# Patient Record
Sex: FEMALE | Employment: UNEMPLOYED | URBAN - METROPOLITAN AREA
[De-identification: names, ages, dates, MRNs, and addresses within clinical notes are randomized per-mention and may not be internally consistent; named-entity substitution may affect disease eponyms.]

---
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
## 2018-03-19 ENCOUNTER — OFFICE VISIT (OUTPATIENT)
Dept: FAMILY MEDICINE CLINIC | Age: 18
End: 2018-03-19

## 2018-03-19 VITALS
SYSTOLIC BLOOD PRESSURE: 125 MMHG | HEART RATE: 81 BPM | WEIGHT: 115 LBS | BODY MASS INDEX: 22.58 KG/M2 | TEMPERATURE: 97.5 F | HEIGHT: 60 IN | DIASTOLIC BLOOD PRESSURE: 66 MMHG

## 2018-03-19 DIAGNOSIS — H53.9 VISION CHANGES: ICD-10-CM

## 2018-03-19 DIAGNOSIS — Z23 ENCOUNTER FOR IMMUNIZATION: ICD-10-CM

## 2018-03-19 DIAGNOSIS — Z13.9 ENCOUNTER FOR SCREENING: Primary | ICD-10-CM

## 2018-03-19 LAB — HGB BLD-MCNC: 12.1 G/DL

## 2018-03-19 NOTE — PROGRESS NOTES
Assessment/Plan:    Diagnoses and all orders for this visit:    1. Encounter for screening  -     AMB POC HEMOGLOBIN (HGB)    2. Vision changes  Refer to [facility]ena's best  3. Lactating mother  She needs to be on PNV, this was discussed with her    4. Encounter for immunization  -     Influenza virus vaccine (QUADRIVALENT PRES FREE SYRINGE) IM (62881)    Other orders  -     prenatal vit-calcium-iron-fa (PRENATAL PLUS WITH CALCIUM) 27 mg iron- 1 mg tab; Take 1 Tab by mouth daily. Indications: pregnancy    Try to nap when baby does if she is not feeling rested     Follow-up Disposition:  Return if symptoms worsen or fail to improve. Keenan Brawn McFerren, PA-C Merlin Chapman Bullion expressed understanding of this plan. An AVS was printed and given to the patient.      ----------------------------------------------------------------------    Chief Complaint   Patient presents with    Fatigue    Dizziness    Immunization/Injection       History of Present Illness:  22 yo new pt here for fatigue. She is a new mom and is breastfeeding her baby. She is not taking PNV. She \"didn't know\" she was to continue the PNV. She has a 9 month old baby. The baby co-sleeps with the parents, and feeds most of the night. The pt is staying at home with the baby and not working. She does not have any family to help her. She has a mom in town but she works full time. She is here today with her . They have a supportive relationship but he is always working. She denies any risk of DV. She is not on any form of birth control. She doesn't want to use pills and he doesn't like condoms. We had a milagro discussion about how this will likely end up in another pregnancy and she does not want that now she states. Will give her info for the health dept and they can discuss other options with her. She has problems with seeing small letters when she is reading.  This is new she states        Past Medical History:   Diagnosis Date    History of varicella     at age 1       Current Outpatient Prescriptions   Medication Sig Dispense Refill    prenatal vit-calcium-iron-fa (PRENATAL PLUS WITH CALCIUM) 27 mg iron- 1 mg tab Take 1 Tab by mouth daily. Indications: pregnancy 90 Tab 4    ferrous sulfate 325 mg (65 mg iron) tablet Take 1 Tab by mouth daily (with breakfast). 30 Tab 0    ibuprofen (MOTRIN) 800 mg tablet Take 1 Tab by mouth every six (6) hours as needed for Pain. 40 Tab 0    Heating Pads pads Apply to neck for pain relieve. Aplique al area del marianela para tratar dolor. 1 Each 0    docusate sodium (COLACE) 100 mg capsule Take 1 Cap by mouth daily. 30 Cap 0       No Known Allergies    Social History   Substance Use Topics    Smoking status: Never Smoker    Smokeless tobacco: Never Used    Alcohol use No       No family history on file.     Physical Exam:     Visit Vitals    /66 (BP 1 Location: Left arm, BP Patient Position: Sitting)    Pulse 81    Temp 97.5 °F (36.4 °C) (Oral)    Ht 4' 11.65\" (1.515 m)    Wt 115 lb (52.2 kg)    LMP 03/03/2018    BMI 22.73 kg/m2     Smiling, looks well, holding healthy 20 lb baby on her lap   A&Ox3  WDWN NAD  Respirations normal and non labored  Cor RRR s1s2  Lungs CTA adam  Lab Results   Component Value Date/Time    Hemoglobin (POC) 12.1 03/19/2018 10:05 AM    HGB 12.1 05/17/2017 04:42 AM

## 2018-03-19 NOTE — PROGRESS NOTES
Printed AVS, provided to pt and reviewed. Pt indicated understanding and had no questions. Told pt that rx's have been sent to pharmacy and they should be ready for  in approximately 2 hrs.reviewed the prenatal vitamin's with the pt Provided pt with eyecare resources. Gave info on the HD for contraceptive. Requests flu vaccine; denies fever, egg allergy. Immunization given per protocol and recorded in [address]. VIS information sheet given, explained possible S/E. Reviewed sx indicating need to be seen in ER. Pt had no adverse reaction at time of discharge. Maico Lima was the .  Royal Lorri RN

## 2018-03-19 NOTE — MR AVS SNAPSHOT
[address] [facility] [address] 
 
 
 [facility] [address] 
[phone] Patient: Natasha Moreno MRN: [id number] :2000 Visit Information Teedannielle Domínguez dannielle Raulsheyla Personal Médico Departamento Teléfono del Dep. Número de visita 3/19/2018 10:45 AM MADELEINE Valle[address] [phone] Follow-up Instructions Return if symptoms worsen or fail to improve. Your Appointments 3/19/2018 10:45 AM  
[facility]: Sick Visit [facility] [address] [facility] 7 [id number]  
[phone]  
  
   
 [facility] [address] [place] [address] [phone] Upcoming Health Maintenance Date Due Hepatitis A Peds Age 1-18 (2 of 2 - Standard Series) 10/22/2016 HPV AGE 9Y-26Y (3 of 3 - Female 3 Dose Series) 2017 Influenza Age 5 to Adult 2017 DTaP/Tdap/Td series (3 - Td) 2017 Alergias  Review Complete El: 2017 Por: JoséM iguel Denny RN  
 A partir del:  3/19/2018 No Known Allergies Vacunas actuales Revisadas el:  2016 Marilee Friendly HPV 2016 HPV (9-valent) 2016 Hep A Vaccine 2016 Hep B Vaccine 2016 Hep B, Adol/Ped 2016 IPV 2016 Influenza Vaccine 2016 Influenza Vaccine (Quad) PF 10/31/2016 MMR 2016, 2016 Meningococcal ACWY Vaccine 2016 Poliovirus vaccine 2016 Td, Adsorbed PF 2016 Tdap 2017, 2016 Varicella Virus Vaccine 2016 No revisadas esta visita You Were Diagnosed With   
  
 Ferdinand Francois Encounter for screening    -  Primary ICD-10-CM: Z13.9 ICD-9-CM: V82.9 Vision changes     ICD-10-CM: H53.9 ICD-9-CM: 368.9 Lactating mother     ICD-10-CM: Z39.1 ICD-9-CM: V24.1 Partes vitales PS Pulso Temperatura [place] ( percentil de crecimiento) Peso (percentil de crecimiento) 125/66 (97 %/ 66 %)* (BP 1 Location: Left arm, BP Patient Position: Sitting) 81 97.5 °F (36.4 °C) (Oral) 4' 11.65\" (1.515 m) (3 %, Z= -1.82) 115 lb (52.2 kg) (24 %, Z= -0.71) LMP (última salazar) BMI ([address]) Estado obstétrico Estatus de tabaquísmo 03/03/2018 22.73 kg/m2 (61 %, Z= 0.29) Having regular periods Never Smoker *BP percentiles are based on NHBPEP's 4th Report Growth percentiles are based on CDC 2-20 Years data. Historial de signos vitales BMI and BSA Data Body Mass Index Body Surface Area  
 22.73 kg/m 2 1.48 m 2 [facility] Name Phone [facility], [address] [place] [address] Street [facility] [phone] Guerrero lista de medicamentos actualizada Lorenda Leventhal actualizada 3/19/18 10:35 AM.  Tricia Laming use guerrero lista de medicamentos más reciente. docusate sodium 100 mg capsule También conocido gladis:  Lety Bridgett Take 1 Cap by mouth daily. ferrous sulfate 325 mg (65 mg iron) tablet Take 1 Tab by mouth daily (with breakfast). Heating Pads Pads Apply to neck for pain relieve. Aplique al area del marianela para tratar dolor. ibuprofen 800 mg tablet También conocido gladis:  MOTRIN Take 1 Tab by mouth every six (6) hours as needed for Pain.  
  
 prenatal vit-calcium-iron-fa 27 mg iron- 1 mg Tab También conocido gladis:  PRENATAL PLUS with CALCIUM Take 1 Tab by mouth daily. Indications: pregnancy Recetas Enviado a la Felix Refills  
 prenatal vit-calcium-iron-fa (PRENATAL PLUS WITH CALCIUM) 27 mg iron- 1 mg tab 4 Sig: Take 1 Tab by mouth daily. Indications: pregnancy Class: Normal  
 Pharmacy: [facility] [address], [address] #: [phone] Route: Oral  
  
Hicimos lo siguiente AMB POC HEMOGLOBIN (HGB) [31437 CPT(R)] Instrucciones de seguimiento Return if symptoms worsen or fail to improve. Introducing [facility]! [facility] introduce portal paciente MyChart . Ahora se puede acceder a partes de guerrero expediente médico, enviar por correo electrónico la oficina de guerrero médico y solicitar renovaciones de medicamentos en línea. En guerrero navegador de Internet , Chi Suggs a [email or web address] Miya clic en el usuario por Pa Badillo? Jaya Chute clic aquí en la sesión [facility]. Verá la página de registro [place]. Ingrese guerrero código de [employer] Magdalena cynthia y gladis aparece a continuación. Usted no tendrá que [employer] código después de maryann completado el proceso de registro . Si usted no se inscribe antes de la fecha de caducidad , debe solicitar un nuevo código. · MyChart Código de acceso : [id number] Expires: 6/17/2018 10:35 AM 
 
Ingresa los últimos cuatro dígitos de guerrero Número de Seguro Social ( xxxx ) y fecha de nacimiento ( dd / mm / aaaa ) gladis se indica y miya clic en Enviar. Usted será llevado a la siguiente página de registro . Crear un ID MyChart . Esta será guerrero ID de inicio de sesión de MyChart y no puede ser [place] , por lo que pensar en rafa que es Oralee Lobstein y fácil de recordar . Crear rafa contraseña MyChart . Usted puede cambiar guerrero contraseña en cualquier momento . Ingrese guerrero Password Reset de preguntas y [place] . [place] se puede utilizar en un momento posterior si usted olvida guerrero contraseña. Introduzca guerrero dirección de correo electrónico . Rakesh Bunch recibirá rafa notificación por correo electrónico cuando la nueva información está disponible en MyChart . [place] Gianotti clic en Registrarse. Carmen  alberto y descargar porciones de guerrero expediente médico. 
Miya clic en el enlace de descarga del menú Resumen para descargar rafa copia portátil de guerrero información médica . Si tiene [employer] , por favor visite la sección de preguntas frecuentes del sitio web MyChart . Recuerde, MyChart NO es que se utilizará para las necesidades urgentes. Para emergencias médicas , llame al 911 . Ahora disponible en guerrero iPhone y Android ! Por favor proporcione karla resumen de la documentación de cuidado a guerrero próximo proveedor. Your primary care clinician is listed as Levonia Parrot. Anthon Runner. If you have any questions after today's visit, please call [phone].

## 2018-06-05 ENCOUNTER — HOSPITAL ENCOUNTER (OUTPATIENT)
Dept: LAB | Age: 18
Discharge: HOME | End: 2018-06-05

## 2018-06-05 ENCOUNTER — OFFICE VISIT (OUTPATIENT)
Dept: FAMILY MEDICINE CLINIC | Age: 18
End: 2018-06-05

## 2018-06-05 VITALS
HEIGHT: 60 IN | SYSTOLIC BLOOD PRESSURE: 106 MMHG | WEIGHT: 117 LBS | BODY MASS INDEX: 22.97 KG/M2 | DIASTOLIC BLOOD PRESSURE: 74 MMHG | HEART RATE: 68 BPM | TEMPERATURE: 97.9 F

## 2018-06-05 DIAGNOSIS — R53.83 FATIGUE, UNSPECIFIED TYPE: ICD-10-CM

## 2018-06-05 DIAGNOSIS — Z13.9 ENCOUNTER FOR SCREENING: Primary | ICD-10-CM

## 2018-06-05 DIAGNOSIS — R10.2 PELVIC PAIN: ICD-10-CM

## 2018-06-05 LAB
ALBUMIN SERPL-MCNC: 4.4 G/DL (ref 3.5–5)
ALBUMIN/GLOB SERPL: 1.3 {RATIO} (ref 1.1–2.2)
ALP SERPL-CCNC: 91 U/L (ref 45–117)
ALT SERPL-CCNC: 37 U/L (ref 12–78)
ANION GAP SERPL CALC-SCNC: 10 MMOL/L (ref 5–15)
AST SERPL-CCNC: 21 U/L (ref 15–37)
BILIRUB SERPL-MCNC: 0.4 MG/DL (ref 0.2–1)
BILIRUB UR QL STRIP: NEGATIVE
BUN SERPL-MCNC: 11 MG/DL (ref 6–20)
BUN/CREAT SERPL: 19 (ref 12–20)
CALCIUM SERPL-MCNC: 9 MG/DL (ref 8.5–10.1)
CHLORIDE SERPL-SCNC: 105 MMOL/L (ref 97–108)
CO2 SERPL-SCNC: 24 MMOL/L (ref 21–32)
CREAT SERPL-MCNC: 0.57 MG/DL (ref 0.55–1.02)
ERYTHROCYTE [DISTWIDTH] IN BLOOD BY AUTOMATED COUNT: 12.5 % (ref 11.5–14.5)
EST. AVERAGE GLUCOSE BLD GHB EST-MCNC: 105 MG/DL
GLOBULIN SER CALC-MCNC: 3.4 G/DL (ref 2–4)
GLUCOSE SERPL-MCNC: 80 MG/DL (ref 65–100)
GLUCOSE UR-MCNC: NEGATIVE MG/DL
HBA1C MFR BLD: 5.3 % (ref 4.2–6.3)
HCT VFR BLD AUTO: 38.8 % (ref 35–47)
HGB BLD-MCNC: 12.6 G/DL (ref 11.5–16)
KETONES P FAST UR STRIP-MCNC: NEGATIVE MG/DL
MCH RBC QN AUTO: 30.5 PG (ref 26–34)
MCHC RBC AUTO-ENTMCNC: 32.5 G/DL (ref 30–36.5)
MCV RBC AUTO: 93.9 FL (ref 80–99)
NRBC # BLD: 0 K/UL (ref 0–0.01)
NRBC BLD-RTO: 0 PER 100 WBC
PH UR STRIP: 5.5 [PH] (ref 4.6–8)
PLATELET # BLD AUTO: 189 K/UL (ref 150–400)
PMV BLD AUTO: 11.9 FL (ref 8.9–12.9)
POTASSIUM SERPL-SCNC: 4.2 MMOL/L (ref 3.5–5.1)
PROT SERPL-MCNC: 7.8 G/DL (ref 6.4–8.2)
PROT UR QL STRIP: NEGATIVE
RBC # BLD AUTO: 4.13 M/UL (ref 3.8–5.2)
SODIUM SERPL-SCNC: 139 MMOL/L (ref 136–145)
SP GR UR STRIP: 1.02 (ref 1–1.03)
T4 FREE SERPL-MCNC: 0.9 NG/DL (ref 0.8–1.5)
TSH SERPL DL<=0.05 MIU/L-ACNC: 1.78 UIU/ML (ref 0.36–3.74)
UA UROBILINOGEN AMB POC: NORMAL (ref 0.2–1)
URINALYSIS CLARITY POC: CLEAR
URINALYSIS COLOR POC: YELLOW
URINE BLOOD POC: NORMAL
URINE LEUKOCYTES POC: NEGATIVE
URINE NITRITES POC: NEGATIVE
WBC # BLD AUTO: 12.2 K/UL (ref 3.6–11)

## 2018-06-05 PROCEDURE — 85027 COMPLETE CBC AUTOMATED: CPT | Performed by: NURSE PRACTITIONER

## 2018-06-05 PROCEDURE — 87186 SC STD MICRODIL/AGAR DIL: CPT | Performed by: NURSE PRACTITIONER

## 2018-06-05 PROCEDURE — 87491 CHLMYD TRACH DNA AMP PROBE: CPT | Performed by: NURSE PRACTITIONER

## 2018-06-05 PROCEDURE — 87077 CULTURE AEROBIC IDENTIFY: CPT | Performed by: NURSE PRACTITIONER

## 2018-06-05 PROCEDURE — 83036 HEMOGLOBIN GLYCOSYLATED A1C: CPT | Performed by: NURSE PRACTITIONER

## 2018-06-05 PROCEDURE — 84439 ASSAY OF FREE THYROXINE: CPT | Performed by: NURSE PRACTITIONER

## 2018-06-05 PROCEDURE — 84443 ASSAY THYROID STIM HORMONE: CPT | Performed by: NURSE PRACTITIONER

## 2018-06-05 PROCEDURE — 87086 URINE CULTURE/COLONY COUNT: CPT | Performed by: NURSE PRACTITIONER

## 2018-06-05 PROCEDURE — 80053 COMPREHEN METABOLIC PANEL: CPT | Performed by: NURSE PRACTITIONER

## 2018-06-05 RX ORDER — IBUPROFEN 800 MG/1
800 TABLET ORAL
Qty: 40 TAB | Refills: 1 | Status: SHIPPED | OUTPATIENT
Start: 2018-06-05

## 2018-06-05 NOTE — PATIENT INSTRUCTIONS
Micción frecuente: Instrucciones de cuidado - [ Frequent Urination: Care Instructions ]  Instrucciones de cuidado  Un síntoma común de problemas urinarios, tales gladis infecciones de las vías urinarias, es tener a menudo ganas de orinar olivier por lo general eliminar poca orina. La vejiga puede inflamarse. [place] puede causar ganas de orinar. Es posible que usted trate de orinar con más frecuencia de lo habitual para tratar de calmar yanna impulso. La micción frecuente (orinar con frecuencia) también puede ser causada por infecciones de transmisión sexual (STI, por keshia siglas en inglés) o cálculos renales. O puede ocurrir cuando algo irrita el conducto que transporta la orina de la vejiga al exterior del cuerpo (uretra). También puede ser rafa señal de diabetes. Puede ser difícil encontrar la causa. Es posible que usted deba hacerse pruebas. La atención de seguimiento es rafa parte clave de guerrero tratamiento y seguridad. Asegúrese de hacer y acudir a todas las citas, y llame a guerrero médico si está teniendo problemas. También es rafa buena idea saber los resultados de los exámenes y mantener rafa lista de los medicamentos que dipak. ¿Cómo puede cuidarse en el hogar? · [employer] agua mile los siguientes betty o [address] [place]. [place] ayudará a que la orina sea menos concentrada. (Si tiene enfermedad de los riñones, el corazón o el hígado y tiene que [place]'s líquidos, hable con guerrero médico antes de aumentar la cantidad de líquidos que apolinar). · Evite las bebidas gaseosas o con cafeína. Pueden irritar la vejiga. Para las mujeres:  · Orine inmediatamente después de maryann tenido relaciones sexuales. · Después de ir al baño, límpiese de adelante hacia atrás. · Evite el uso de duchas vaginales, los yogi de burbujas y los [address] de higiene femenina. Y evite otros productos para la higiene femenina que contengan desodorantes. ¿Cuándo debe pedir ayuda? Llame a guerrero médico ahora mismo o busque atención médica inmediata si:  ?  · Tiene síntomas nuevos gladis fiebre, náuseas o vómito. ? · Tiene síntomas nuevos o peores de un problema urinario. Por ejemplo:  ¨ Tiene zaki o pus en la orina. ¨ Tiene escalofríos o le duele el cuerpo. ¨ Siente dolor al [place]. ¨ Tiene dolor en la gisselle o el abdomen. ¨ Tiene dolor de espalda graciela debajo de la caja torácica (el flanco). ? Vigile muy de cerca los cambios en guerrero cole, y asegúrese de comunicarse con guerrero médico si siente más sed de lo habitual.  ¿Dónde puede encontrar más información en inglés? Hannah Marcano a [email or web address]-abilio.info/. Deepika Martinez S434 en la búsqueda para aprender más acerca de \"Micción frecuente: Instrucciones de cuidado - [ Frequent Urination: Care Instructions ]. \"  Revisado: 12 [place], 2017  Versión del contenido: 11.4  © 3247-9215 Healthwise, Incorporated. Las instrucciones de cuidado fueron adaptadas bajo licencia por [employer] (which disclaims liability or warranty for this information). Si usted tiene [place] afección médica o sobre estas instrucciones, siempre pregunte a guerrero profesional de cole. Healthwise, Incorporated niega toda garantía o responsabilidad por guerrero uso de esta información.

## 2018-06-05 NOTE — PROGRESS NOTES
Patient seen for discharge with assistance from Company  # [id number]. We reviewed the AVS, prescription, pharmacy location and the vision and family [facility] [place] The patient expressed understanding and will go now to registration to schedule a provider follow-up appointment in 6-8 weeks.  Suma Lancaster RN

## 2018-06-05 NOTE — PROGRESS NOTES
Subjective:     Chief Complaint   Patient presents with    Urinary Frequency     and feels depressed everyday    Joint Pain        She  is a 19 y.o. female who presents for evaluation of back pain and urinary frequency. Onset approx 3-4 days ago.     + urinary frequency and expresses concern re: \"kidney problem\" during most recent pregnancy. No assoc malodour nor dysuria. Pain is on L flank only. Has taken OTC APAP w/ mod relief. Reports poor sleep r/t her infant dtr. No provoking stress at home w/ spouse/family. Pt is still breast feeding. Cont to take PNV. No BC/OCP used, declines interest in another child. LMP early may, has been alternating months since birth of most recent child. Did not go for filated eye exam as discussed at [address] [place] [address]. ROS  Gen - no fever/chills  Resp - no dyspnea or cough  CV - no chest pain or MAK  Rest per HPI    Past Medical History:   Diagnosis Date    History of varicella     at age 1     No past surgical history on file. Current Outpatient Prescriptions on File Prior to Visit   Medication Sig Dispense Refill    prenatal vit-calcium-iron-fa (PRENATAL PLUS WITH CALCIUM) 27 mg iron- 1 mg tab Take 1 Tab by mouth daily. Indications: pregnancy 90 Tab 4    ferrous sulfate 325 mg (65 mg iron) tablet Take 1 Tab by mouth daily (with breakfast). 30 Tab 0    ibuprofen (MOTRIN) 800 mg tablet Take 1 Tab by mouth every six (6) hours as needed for Pain. 40 Tab 0    Heating Pads pads Apply to neck for pain relieve. Aplique al area del marianela para tratar dolor. 1 Each 0    docusate sodium (COLACE) 100 mg capsule Take 1 Cap by mouth daily. 30 Cap 0     No current facility-administered medications on file prior to visit.          Objective:     Vitals:    06/05/18 1223   BP: 106/74   Pulse: 68   Temp: 97.9 °F (36.6 °C)   TempSrc: Oral   Weight: 117 lb (53.1 kg)   Height: 4' 11.84\" (1.52 m)       Physical Examination:  General appearance - alert, well appearing, and in no distress  Eyes -sclera anicteric  Neck - supple, no significant adenopathy, no thyromegaly  Chest - clear to auscultation, no wheezes, rales or rhonchi, symmetric air entry  Heart - normal rate, regular rhythm, normal S1, S2, no murmurs, rubs, clicks or gallops  Neurological - alert, oriented, no focal findings or movement disorder noted  Abdomen-BS present/WNL x 4 quads, non-tender/distended, soft,no organomegaly    Assessment/ Plan:   Diagnoses and all orders for this visit:    1. Encounter for screening  -     AMB POC URINALYSIS DIP STICK MANUAL W/O MICRO    2. Pelvic pain  -     METABOLIC PANEL, COMPREHENSIVE; Future  -     CBC W/O DIFF; Future  -     CHLAMYDIA/GC PCR; Future  -     CULTURE, URINE; Future  -     ibuprofen (MOTRIN) 800 mg tablet; Take 1 Tab by mouth every eight (8) hours as needed for Pain. Orion 1 tableta por boca cada 8 horas por necesidad para el dolor. 3. Fatigue, unspecified type  -     METABOLIC PANEL, COMPREHENSIVE; Future  -     CBC W/O DIFF; Future  -     TSH 3RD GENERATION; Future  -     T4, FREE; Future  -     HEMOGLOBIN A1C WITH EAG; Future        ? Causes of S&S. Suspect Pt may have other issues but does not disclose them after mult diff attempts. Check baseline labs and urine studies. PRN motrin for pain. Refer to  for BC/OCP and [facility] for dilated eye exam.       I have discussed the diagnosis with the patient and the intended plan as seen in the above orders. The patient has received an after-visit summary and questions were answered concerning future plans. I have discussed medication side effects and warnings with the patient as well. The patient verbalizes understanding and agreement with the plan.     Follow-up Disposition: Not on File

## 2018-06-05 NOTE — PROGRESS NOTES
Coordination of Care  1. Have you been to the ER, urgent care clinic since your last visit? Hospitalized since your last visit? No    2. Have you seen or consulted any other health care providers outside of the [address] [place] [address] since your last visit? Include any pap smears or colon screening. No    Does the patient need refills? NO    Learning Assessment Complete?  yes  Depression Screening complete in the past 12 months? yes  Results for orders placed or performed in visit on 06/05/18   AMB POC URINALYSIS DIP STICK MANUAL W/O MICRO   Result Value Ref Range    Color (UA POC) Yellow     Clarity (UA POC) Clear     Glucose (UA POC) Negative Negative    Bilirubin (UA POC) Negative Negative    Ketones (UA POC) Negative Negative    Specific gravity (UA POC) 1.025 1.001 - 1.035    Blood (UA POC) Trace Negative    pH (UA POC) 5.5 4.6 - 8.0    Protein (UA POC) Negative Negative    Urobilinogen (UA POC) normal 0.2 - 1    Nitrites (UA POC) Negative Negative    Leukocyte esterase (UA POC) Negative Negative

## 2018-06-06 LAB
C TRACH DNA SPEC QL NAA+PROBE: NEGATIVE
N GONORRHOEA DNA SPEC QL NAA+PROBE: NEGATIVE
SAMPLE TYPE: NORMAL
SERVICE CMNT-IMP: NORMAL
SPECIMEN SOURCE: NORMAL

## 2018-06-07 LAB
BACTERIA SPEC CULT: ABNORMAL
CC UR VC: ABNORMAL
SERVICE CMNT-IMP: ABNORMAL

## 2018-06-07 NOTE — PROGRESS NOTES
Pls inform Pt her C&S did show E coli UTI, recommend Tx w/ Keflex, which is generally well tolerated in nursing infants, advise to monitor for diarrhea/rashes (SE of all Abx). Keflex dose 500mg PO BID x 1 week, which is $4 at [facility]. Pls msg me once Pt receives msg and I will send Rx to pharmacy.      Thank you,  Lenoria Holstein

## 2018-06-09 ENCOUNTER — HOSPITAL ENCOUNTER (EMERGENCY)
Age: 18
Discharge: HOME | End: 2018-06-09
Attending: EMERGENCY MEDICINE
Payer: SELF-PAY

## 2018-06-09 ENCOUNTER — APPOINTMENT (OUTPATIENT)
Dept: GENERAL RADIOLOGY | Age: 18
End: 2018-06-09
Attending: EMERGENCY MEDICINE
Payer: SELF-PAY

## 2018-06-09 VITALS
HEIGHT: 60 IN | WEIGHT: 119.49 LBS | SYSTOLIC BLOOD PRESSURE: 142 MMHG | HEART RATE: 112 BPM | OXYGEN SATURATION: 99 % | RESPIRATION RATE: 16 BRPM | BODY MASS INDEX: 23.46 KG/M2 | DIASTOLIC BLOOD PRESSURE: 87 MMHG | TEMPERATURE: 99.3 F

## 2018-06-09 DIAGNOSIS — M54.50 LOW BACK PAIN WITHOUT SCIATICA, UNSPECIFIED BACK PAIN LATERALITY, UNSPECIFIED CHRONICITY: Primary | ICD-10-CM

## 2018-06-09 LAB — HCG UR QL: NEGATIVE

## 2018-06-09 PROCEDURE — 81025 URINE PREGNANCY TEST: CPT

## 2018-06-09 PROCEDURE — 74011250637 HC RX REV CODE- 250/637: Performed by: EMERGENCY MEDICINE

## 2018-06-09 PROCEDURE — 99283 EMERGENCY DEPT VISIT LOW MDM: CPT

## 2018-06-09 PROCEDURE — 72100 X-RAY EXAM L-S SPINE 2/3 VWS: CPT

## 2018-06-09 RX ORDER — METHOCARBAMOL 750 MG/1
750 TABLET, FILM COATED ORAL
Status: COMPLETED | OUTPATIENT
Start: 2018-06-09 | End: 2018-06-09

## 2018-06-09 RX ORDER — METHOCARBAMOL 750 MG/1
750 TABLET, FILM COATED ORAL 3 TIMES DAILY
Qty: 30 TAB | Refills: 0 | Status: SHIPPED | OUTPATIENT
Start: 2018-06-09

## 2018-06-09 RX ORDER — TRAMADOL HYDROCHLORIDE 50 MG/1
50 TABLET ORAL
Status: COMPLETED | OUTPATIENT
Start: 2018-06-09 | End: 2018-06-09

## 2018-06-09 RX ORDER — TRAMADOL HYDROCHLORIDE 50 MG/1
50 TABLET ORAL
Qty: 20 TAB | Refills: 0 | Status: SHIPPED | OUTPATIENT
Start: 2018-06-09 | End: 2018-06-19

## 2018-06-09 RX ADMIN — METHOCARBAMOL 750 MG: 750 TABLET ORAL at 10:26

## 2018-06-09 RX ADMIN — TRAMADOL HYDROCHLORIDE 50 MG: 50 TABLET, FILM COATED ORAL at 10:45

## 2018-06-09 NOTE — ED TRIAGE NOTES
Pt states has had back pain since last Friday. Has been taking ibuprofen that was prescribed to her via MD with Patric with no improvement.

## 2018-06-09 NOTE — DISCHARGE INSTRUCTIONS
Aprenda sobre cómo tener rafa espalda saludable - [ Learning About How to Have a Healthy Back ]  ¿Qué causa el dolor de espalda? El dolor de espalda a menudo es causado por uso excesivo, distensión o lesión. Por ejemplo, las personas frecuentemente se lastiman la espalda al practicar deportes o trabajar en el aviva, al ser sacudidas en un accidente automovilístico o al levantar algo demasiado pesado. El envejecimiento también desempeña un papel. Maranda Macias y los músculos tienden a perder fuerza a medida que envejece, lo cual aumenta las probabilidades de [facility]. Los discos [employer] huesos de la columna vertebral (vértebras) podrían tener desgaste y ya no proporcionar suficiente amortiguamiento entre los [place]. Un disco que sobresale o que se rompe (hernia de disco) puede presionar sobre los nervios, causando dolor de [place]. En [employer], el dolor de espalda es el resultado de la artritis, de vértebras rotas debido a la pérdida de [employer] (osteoporosis), de rafa enfermedad o de un problema de la columna vertebral.  Aunque la mayoría de las personas tienen dolor de espalda en un momento u otro, hay medidas que se pueden clinton para reducir la probabilidad de sufrirlo. ¿Cómo puede tener rafa espalda saludable? Reduzca la tensión en la espalda mediante rafa buena postura  El desplomarse o encorvarse por sí solo cynthia vez no cause dolor en la parte baja de la espalda. Gaurav rafa vez que la espalda se ha distendido o lesionado, la cynthia postura puede empeorar el dolor. · Duerma en rafa posición que mantenga las curvas naturales de la espalda y en un colchón cómodo. Duerma de lado con rafa almohada entre las rodillas o boca arriba con rafa almohada debajo de las rodillas. Estas posiciones pueden reducir la tensión en la espalda. · Manténgase erguido cuando esté de pie o sentado.  Tener rafa \"buena postura\" por lo general significa que las [place], los hombros y las caderas están en Parthenia Cook recta.  · Si debe permanecer de pie mucho tiempo, ponga un pie sobre un taburete, un bordillo o Steffanie Meth. Cambie de pie cada cierto tiempo. · Siéntese en rafa silla que sea lo suficientemente baja gladis para poner ambos pies en el suelo con las rodillas más o menos al nivel de la cadera. Si guerrero silla o guerrero escritorio son Jessica Sero, utilice un reposapiés con el fin de elevar las rodillas. Colóquese rafa almohada pequeña, rafa toalla enrollada o un rollo lumbar en la curva de la espalda si necesita más apoyo. · Pruebe usar rafa silla ergonómica con apoyo para las rodillas (\"kneeling chair\"), pues ayuda a inclinar las caderas hacia roger. Conover le reduce la presión en la parte baja de la espalda. · Intente sentarse sobre rafa pelota de ejercicio. Puede balancearse de lado a lado, lo que ayuda a mantener la espalda relajada. · Al conducir, Johny's las rodillas theresa al nivel de las caderas. Siéntese derecho y conduzca con ambas simi sobre el volante. Los brazos deben estar levemente flexionados. Reduzca la tensión en la espalda levantando objetos con cuidado  · Agáchese doblando solo la cadera y las rodillas. Si es necesario, ponga rafa rodilla en el suelo y extienda la otra rodilla frente a usted en ángulo recto (genuflexión). · Empuje el pecho hacia adelante. Conover le ayuda a mantener la parte superior de la espalda derecha mientras mantiene un arco ligero en la parte baja. · Sostenga la carga tan cerca del cuerpo gladis sea posible, a la altura del ombligo. · Utilice los pies para cambiar de dirección con pasos cortos. · Dirija con las caderas al cambiar de dirección. Mantenga los hombros en línea con las caderas [place] se desplaza. · Asiente la carga con cuidado, acuclillándose únicamente con las rodillas y las caderas. Ejercite y estire la espalda  · Keesha Mews algo de ejercicio la mayoría de los días de la [place], si guerrero médico lo aprueba. Puede caminar, correr, nadar o montar en bicicleta.   · [employer] músculos de la espalda. Los siguientes son algunos ejercicios que puede probar:  ¨ Acuéstese de espalda y lleve suavemente rafa rodilla flexionada hacia el pecho. Vuelva a poner yanna pie en el suelo y luego curtis lo mismo con la otra rodilla. ¨ Curtis inclinaciones de pelvis. Acuéstese de espalda con las rodillas flexionadas. Contraiga los músculos abdominales. Hunda el ombligo hacia adentro y [place], en dirección a las costillas. Deberá sentir gladis si la espalda estuviera ejerciendo presión sobre el suelo y que las caderas y la pelvis se despegan levemente del suelo. Mantenga la posición mile 6 segundos mientras respira de Nancy pausada. ¨ Siéntese con la espalda contra la pared. · Mantenga obed los músculos del tronco. Los músculos de la espalda, el abdomen y los glúteos sostienen la columna vertebral.  ¨ Hunda el abdomen e imagine que está llevando el ombligo hacia la columna. Mantenga la posición mile 6 segundos y luego relájese. Recuerde seguir respirando de manera normal [employer]. ¨ Curtis abdominales. Hágalos siempre con las rodillas dobladas. Mantenga la parte baja de la espalda sobre el suelo y [employer] hombros hacia las rodillas con un movimiento lento y uniforme. Mantenga los brazos cruzados sobre el pecho. Si esto le causa molestias en el marianela, pruebe a poner las simi detrás del marianela (no de la collette), con los codos abiertos. ¨ Acuéstese boca arriba con las rodillas flexionadas y los pies apoyados sobre el suelo. Contraiga los músculos abdominales y luego empuje con los pies y eleve las nalgas algunas pulgadas. Mantenga la posición mile 6 segundos mientras continúa respirando de Nancy normal y luego vuelva a bajar lentamente hacia el suelo. Repita de 8 a 12 veces. ¨ Si le gusta el ejercicio en yaakov, pruebe con Pilates o yoga.  Estas clases tienen posiciones que fortalecen los músculos del tronco.  Lleve un estilo de sharri saludable  · Mantenga un peso saludable para evitar sobrecargar la espalda. · No fume. Fumar aumenta el riesgo de osteoporosis, que debilita la columna vertebral. Si necesita ayuda para dejar de fumar, hable con guerrero médico sobre programas y medicamentos para dejar de fumar. Estos pueden aumentar keshia probabilidades de dejar el hábito para siempre. ¿Dónde puede encontrar más información en inglés? Robin Bailey a http://rishi-abilio.info/. Escriba L315 en la búsqueda para aprender más acerca de \"Aprenda sobre cómo tener rafa espalda saludable - [ Learning About How to Have a Healthy Back ]. \"  Revisado: 21 marzo, 2017  Versión del contenido: 11.4  © 0056-0183 Healthwise, Incorporated. Las instrucciones de cuidado fueron adaptadas bajo licencia por [employer] (which disclaims liability or warranty for this information). Si usted tiene [place] afección médica o sobre estas instrucciones, siempre pregunte a guerrero profesional de cole. [employer] niega toda garantía o responsabilidad por guerrero uso de esta información.

## 2018-06-09 NOTE — ED PROVIDER NOTES
Patient is a 19 y.o. female presenting with back pain. The history is provided by the patient. The history is limited by a language barrier. A  was used. Back Pain    This is a new problem. The current episode started more than 1 week ago. The problem has been gradually worsening. The pain is associated with no known injury. The pain is present in the lower back and upper back (shoulders). The pain is at a severity of 10/10. Pertinent negatives include no chest pain, no fever and no abdominal pain. She has tried NSAIDs for the symptoms. The treatment provided no relief. Past Medical History:   Diagnosis Date    History of varicella     at age 1       No past surgical history on file. No family history on file. Social History     Social History    Marital status: SINGLE     Spouse name: N/A    Number of children: N/A    Years of education: N/A     Occupational History    Not on file. Social History Main Topics    Smoking status: Never Smoker    Smokeless tobacco: Never Used    Alcohol use No    Drug use: No    Sexual activity: Not on file     Other Topics Concern    Not on file     Social History Narrative         ALLERGIES: Review of patient's allergies indicates no known allergies. Review of Systems   Constitutional: Negative for chills and fever. Respiratory: Negative for chest tightness and shortness of breath. Cardiovascular: Negative for chest pain. Gastrointestinal: Negative for abdominal pain. Musculoskeletal: Positive for back pain. Negative for neck pain. Lower back and upper back and shoulders       Vitals:    06/09/18 1012   BP: 142/87   Pulse: (!) 112   Resp: 16   Temp: 99.3 °F (37.4 °C)   SpO2: 99%   Weight: 54.2 kg (119 lb 7.8 oz)   Height: 5' (1.524 m)            Physical Exam   Constitutional: She is oriented to person, place, and time. She appears well-developed and well-nourished. No distress.    HENT:   Head: Normocephalic and atraumatic. Eyes: Conjunctivae and EOM are normal.   Neck: Normal range of motion. Neck supple. Cardiovascular: Normal rate, regular rhythm and normal heart sounds. No murmur heard. Pulmonary/Chest: Effort normal and breath sounds normal. No stridor. No respiratory distress. Abdominal: Soft. There is no tenderness. Musculoskeletal: Normal range of motion. She exhibits tenderness. She exhibits no edema. Diffuse lower back and upper back as well   Neurological: She is alert and oriented to person, place, and time. No cranial nerve deficit. Skin: She is not diaphoretic. Psychiatric: She has a normal mood and affect. Nursing note and vitals reviewed.        MDM  Number of Diagnoses or Management Options  Low back pain without sciatica, unspecified back pain laterality, unspecified chronicity:   Diagnosis management comments: X-ray for occult trauma - meds for symptoms and if d/c home to f/u with her doctor       Amount and/or Complexity of Data Reviewed  Tests in the radiology section of CPT®: reviewed and ordered          ED Course       Procedures

## 2018-06-12 ENCOUNTER — TELEPHONE (OUTPATIENT)
Dept: FAMILY MEDICINE CLINIC | Age: 18
End: 2018-06-12

## 2018-06-12 RX ORDER — CEPHALEXIN 500 MG/1
500 CAPSULE ORAL 2 TIMES DAILY
Qty: 14 CAP | Refills: 0 | Status: SHIPPED | OUTPATIENT
Start: 2018-06-12 | End: 2018-06-19

## 2018-06-12 NOTE — PROGRESS NOTES
Reached pt. Agrees to ABX . Instructed on results per provider notes. Message to provider to order. Call assisted by [employer] #[id number].

## 2018-06-12 NOTE — TELEPHONE ENCOUNTER
----- Message from Ryann Muhammad RN sent at 6/12/2018  3:34 PM EDT -----  Reached pt. Agrees to ABX . Instructed on results per provider notes. Message to provider to order. Call assisted by [email or web address] #[id number].

## 2018-07-23 ENCOUNTER — HOSPITAL ENCOUNTER (EMERGENCY)
Age: 18
Discharge: HOME | End: 2018-07-23
Attending: EMERGENCY MEDICINE
Payer: SELF-PAY

## 2018-07-23 VITALS
HEIGHT: 60 IN | DIASTOLIC BLOOD PRESSURE: 88 MMHG | RESPIRATION RATE: 22 BRPM | TEMPERATURE: 98.2 F | SYSTOLIC BLOOD PRESSURE: 125 MMHG | WEIGHT: 120 LBS | OXYGEN SATURATION: 99 % | HEART RATE: 116 BPM | BODY MASS INDEX: 23.56 KG/M2

## 2018-07-23 DIAGNOSIS — T16.1XXA FOREIGN BODY IN RIGHT EAR, INITIAL ENCOUNTER: Primary | ICD-10-CM

## 2018-07-23 PROCEDURE — 75810000145 HC RMVL FB EAR W/O GEN ANES

## 2018-07-23 PROCEDURE — 74011000250 HC RX REV CODE- 250: Performed by: EMERGENCY MEDICINE

## 2018-07-23 PROCEDURE — 99284 EMERGENCY DEPT VISIT MOD MDM: CPT

## 2018-07-23 RX ORDER — LIDOCAINE HYDROCHLORIDE 10 MG/ML
1 INJECTION, SOLUTION EPIDURAL; INFILTRATION; INTRACAUDAL; PERINEURAL ONCE
Status: COMPLETED | OUTPATIENT
Start: 2018-07-23 | End: 2018-07-23

## 2018-07-23 RX ORDER — LIDOCAINE HYDROCHLORIDE 20 MG/ML
15 SOLUTION OROPHARYNGEAL
Status: DISCONTINUED | OUTPATIENT
Start: 2018-07-23 | End: 2018-07-23 | Stop reason: HOSPADM

## 2018-07-23 RX ORDER — LIDOCAINE HYDROCHLORIDE 20 MG/ML
SOLUTION OROPHARYNGEAL
Status: DISCONTINUED
Start: 2018-07-23 | End: 2018-07-23 | Stop reason: HOSPADM

## 2018-07-23 RX ADMIN — LIDOCAINE HYDROCHLORIDE 1 ML: 10 INJECTION, SOLUTION EPIDURAL; INFILTRATION; INTRACAUDAL; PERINEURAL at 08:39

## 2018-07-23 NOTE — ED PROVIDER NOTES
HPI Comments: 19 y.o. female with past medical history significant for varicella who presents to the ED with chief complaint of foreign body in ear. Pt reports she has an unknown insect in her right ear accompanied by associated discomfort. There are no other acute medical complaints voiced at this time. Social Hx: Never smoker. Denies EtOH or drug use. PCP: Judy Hathaway NP    Note written by Mickey Basurto, as dictated by Flakito Sam MD 8:04 AM     The history is provided by the patient and the spouse. Past Medical History:   Diagnosis Date    History of varicella     at age 1       No past surgical history on file. No family history on file. Social History     Social History    Marital status: SINGLE     Spouse name: N/A    Number of children: N/A    Years of education: N/A     Occupational History    Not on file. Social History Main Topics    Smoking status: Never Smoker    Smokeless tobacco: Never Used    Alcohol use No    Drug use: No    Sexual activity: Not on file     Other Topics Concern    Not on file     Social History Narrative         ALLERGIES: Review of patient's allergies indicates no known allergies. Review of Systems   Constitutional: Negative for chills and fever. HENT:        +insect in right ear with associated discomfort   Respiratory: Negative for cough and shortness of breath. Cardiovascular: Negative for chest pain and leg swelling. Gastrointestinal: Negative for diarrhea and vomiting. Musculoskeletal: Negative for back pain and neck pain. All other systems reviewed and are negative. Vitals:    07/23/18 0754   BP: 125/88   Pulse: (!) 116   Resp: 22   Temp: 98.2 °F (36.8 °C)   SpO2: 99%   Weight: 54.4 kg (120 lb)   Height: 5' (1.524 m)            Physical Exam   Constitutional: She appears well-developed and well-nourished. She appears distressed. HENT:   Head: Atraumatic.    Small insect in right external auditory canal that is obscuring TM from view. Eyes: Conjunctivae are normal.   Neck: Neck supple. Cardiovascular: Normal rate and regular rhythm. Pulmonary/Chest: Effort normal. No stridor. No respiratory distress. Abdominal: She exhibits no distension. Musculoskeletal: Normal range of motion. Neurological: She is alert. Coordination normal.   Skin: Skin is warm and dry. Psychiatric: She has a normal mood and affect. Nursing note and vitals reviewed. Note written by Mickey Reyes, as dictated by Susie Cronin MD 8:05 AM    MDM    19 y.o. female presents with insect in right ear. Removed as documented below after killing it with lidocaine. No residual FB noted, no trauma to TM. Plan to follow up with PCP as needed and return precautions discussed for worsening or new concerning symptoms. ED Course       Foreign Body Removal  Date/Time: 7/23/2018 8:44 AM  Performed by: Patel Oshea  Authorized by: Patel Oshea     Consent:     Consent obtained:  Verbal    Consent given by:  Patient  Location:     Location:  Ear    Ear location:  R ear  Procedure details:     Removal mechanism: Forceps    Foreign bodies recovered:  1    Intact foreign body removal: yes    Post-procedure details:     Confirmation:  No additional foreign bodies on visualization    Patient tolerance of procedure: Tolerated well, no immediate complications  Comments:      Insect in right ear. 1st attempt with irrigation unsuccessful with foreign body still remaining. Directly visulized foreign object and removed with alligator forceps.        Note written by Mickey Reyes, as dictated by Susie Cronin MD 8:44 AM

## 2018-07-23 NOTE — DISCHARGE INSTRUCTIONS
Objeto en el oído: Instrucciones de cuidado - [ Object in the Ear: Care Instructions ]  Instrucciones de cuidado  Un insecto o un objeto en el oído normalmente no daña al oído. Gaurav algunos objetos en el oído pueden causar problemas. Por ejemplo, la comida seca puede expandirse en el oído y James barth puede liberar sustancias químicas. Los [employer] que cotto estado en el oído por más de 24 horas son más difíciles de extraer y pueden causar dolor, infección o sangrado. Si un objeto se empuja con fuerza dentro del oído, podría dañar el tímpano. Guerrero médico probablemente retiró el objeto de guerrero oído mile guerrero examen. Podría sentir el oído sensible por unos días. La atención de seguimiento es rafa parte clave de guerrero tratamiento y seguridad. Asegúrese de hacer y acudir a todas las citas, y llame a guerrero médico si está teniendo problemas. También es rafa buena idea saber los resultados de keshia exámenes y mantener rafa lista de los medicamentos que dipak. ¿Cómo puede cuidarse en el hogar? · Guerrero médico puede usar un medicamento para adormecer guerrero oído. Cuando se le pase el efecto, el dolor de oído podría regresar. [place] un analgésico (medicamento para el dolor) de venta yamilet, gladis acetaminofén (Tylenol), ibuprofeno (Advil, Motrin) o naproxeno (Aleve). Wendie y siga todas las instrucciones de la [place]. · No tome dos o más analgésicos al mismo tiempo a menos que el médico se lo haya indicado. Muchos analgésicos contienen acetaminofén, es decir, Tylenol. El exceso de acetaminofén (Tylenol) puede ser dañino. · Si guerrero médico le recetó antibióticos, tómelos según las indicaciones. No deje de tomarlos por el hecho de sentirse mejor. Debe clinton todos los antibióticos hasta terminarlos. · Es posible que el médico le recete gotas para el oído. Para ponerse las gotas:  ¨ Elodia caliente las gotas rodando el frasco entre keshia simi o colocándolo en guerrero axila por algunos minutos.  Si se pone las gotas frías en el oído puede causarle dolor y mareo.  ¨ Acuéstese, con el oído [facility]. ¨ Coloque la cantidad recetada de gotas en la pared interna del canal auditivo. Mueva la oreja con suavidad para ayudar a que las gotas entren al oído. ¨ Es importante mantener el líquido en el canal auditivo por entre 3 y 5 minutos. · Puede aplicarse calor en el oído para aliviar el dolor. Use un paño tibio o rafa almohadilla térmica ajustada a baja temperatura. · No inserte hisopos, prendedores para el pelo ni otros objetos en el oído. No vierta ningún líquido dentro del oído a menos que así se lo haya indicado guerrero médico.  ¿Cuándo debe pedir ayuda? Llame a guerrero médico ahora mismo o busque atención médica inmediata si:    · Tiene síntomas de rafa infección en el oído, tales gladis:  ¨ Tiene nuevo o mayor dolor, hinchazón, calor o enrojecimiento alrededor o detrás del oído. ¨ Tiene fiebre junto con rigidez en el marianela o dolor de collette intenso.    Preste especial atención a los cambios en guerrero cole y asegúrese de comunicarse con guerrero médico si:    · No mejora después de 2 días (48 horas).     · Tiene síntomas nuevos o que empeoran. ¿Dónde puede encontrar más información en inglés? Newfane Keaton a http://rishi-abilio.info/. Escriba C171 en la búsqueda para aprender más acerca de \"Objeto en el oído: Instrucciones de cuidado - [ Object in the Ear: Care Instructions ]. \"  Revisado: 20 noviembre, 2017  Versión del contenido: 11.7  © 3817-9506 [employer], Incorporated. Las instrucciones de cuidado fueron adaptadas bajo licencia por [employer] (which disclaims liability or warranty for this information). Si usted tiene [place] afección médica o sobre estas instrucciones, siempre pregunte a guerrero profesional de cole. [facility] niega toda garantía o responsabilidad por guerrero uso de esta información.

## 2023-05-09 NOTE — PROGRESS NOTES
Orthopedic surgery consulted, planning for surgical intervention tomorrow, keep n.p.o. after midnight, pain control   Strip reviewed by dr Taras Braga from nurses station. Bed off trendelenburg, low fowlers. Iv bolus continued for epidural preload.